# Patient Record
Sex: MALE | Employment: FULL TIME | URBAN - METROPOLITAN AREA
[De-identification: names, ages, dates, MRNs, and addresses within clinical notes are randomized per-mention and may not be internally consistent; named-entity substitution may affect disease eponyms.]

---

## 2017-06-07 ENCOUNTER — HOSPITAL ENCOUNTER (OUTPATIENT)
Dept: GENERAL RADIOLOGY | Age: 22
Discharge: HOME | End: 2017-06-07
Attending: FAMILY MEDICINE
Payer: COMMERCIAL

## 2017-06-07 ENCOUNTER — OFFICE VISIT (OUTPATIENT)
Dept: FAMILY MEDICINE CLINIC | Facility: CLINIC | Age: 22
End: 2017-06-07

## 2017-06-07 VITALS
SYSTOLIC BLOOD PRESSURE: 114 MMHG | RESPIRATION RATE: 16 BRPM | HEIGHT: 69 IN | TEMPERATURE: 98 F | BODY MASS INDEX: 24.88 KG/M2 | HEART RATE: 74 BPM | DIASTOLIC BLOOD PRESSURE: 77 MMHG | WEIGHT: 168 LBS

## 2017-06-07 DIAGNOSIS — M25.562 CHRONIC PAIN OF BOTH KNEES: Primary | ICD-10-CM

## 2017-06-07 DIAGNOSIS — G89.29 CHRONIC PAIN OF BOTH KNEES: ICD-10-CM

## 2017-06-07 DIAGNOSIS — M25.561 CHRONIC PAIN OF BOTH KNEES: ICD-10-CM

## 2017-06-07 DIAGNOSIS — M25.561 CHRONIC PAIN OF BOTH KNEES: Primary | ICD-10-CM

## 2017-06-07 DIAGNOSIS — G89.29 CHRONIC PAIN OF BOTH KNEES: Primary | ICD-10-CM

## 2017-06-07 DIAGNOSIS — M25.562 CHRONIC PAIN OF BOTH KNEES: ICD-10-CM

## 2017-06-07 PROCEDURE — 99212 OFFICE O/P EST SF 10 MIN: CPT | Performed by: FAMILY MEDICINE

## 2017-06-07 PROCEDURE — 73564 X-RAY EXAM KNEE 4 OR MORE: CPT | Performed by: FAMILY MEDICINE

## 2017-06-07 PROCEDURE — 99202 OFFICE O/P NEW SF 15 MIN: CPT | Performed by: FAMILY MEDICINE

## 2017-06-07 NOTE — PROGRESS NOTES
HPI:    Alphonse Bryson is a 19year old male presents to clinic as a new patient with bilateral knee pain.  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        Meds This Visit:    No prescriptions requested or ordered in this encounter    Imaging & Referrals:  XR KNEE, COMPLETE (4 OR MORE VIEWS

## 2017-06-09 ENCOUNTER — TELEPHONE (OUTPATIENT)
Dept: FAMILY MEDICINE CLINIC | Facility: CLINIC | Age: 22
End: 2017-06-09

## 2017-06-09 NOTE — TELEPHONE ENCOUNTER
Per pt request left VM with results and doctor's recommendations with instructions to call back if any questions. Normal letter and PT order mailed to pt at home address on file.

## 2017-06-09 NOTE — TELEPHONE ENCOUNTER
----- Message from Joseph Cook MD sent at 6/7/2017  3:37 PM CDT -----  Please inform patient that both XRs are normal. I think he should try PT first. Referral placed, patient can go any where that is convenient to him because he has a PPO insurance, I

## 2017-07-17 ENCOUNTER — OFFICE VISIT (OUTPATIENT)
Dept: FAMILY MEDICINE CLINIC | Facility: CLINIC | Age: 22
End: 2017-07-17

## 2017-07-17 ENCOUNTER — TELEPHONE (OUTPATIENT)
Dept: FAMILY MEDICINE CLINIC | Facility: CLINIC | Age: 22
End: 2017-07-17

## 2017-07-17 VITALS
OXYGEN SATURATION: 96 % | BODY MASS INDEX: 24.38 KG/M2 | DIASTOLIC BLOOD PRESSURE: 56 MMHG | TEMPERATURE: 99 F | HEART RATE: 92 BPM | SYSTOLIC BLOOD PRESSURE: 112 MMHG | HEIGHT: 69 IN | WEIGHT: 164.63 LBS

## 2017-07-17 DIAGNOSIS — H10.89 OTHER CONJUNCTIVITIS: Primary | ICD-10-CM

## 2017-07-17 PROCEDURE — 99213 OFFICE O/P EST LOW 20 MIN: CPT | Performed by: FAMILY MEDICINE

## 2017-07-17 PROCEDURE — 99212 OFFICE O/P EST SF 10 MIN: CPT | Performed by: FAMILY MEDICINE

## 2017-07-17 RX ORDER — POLYMYXIN B SULFATE AND TRIMETHOPRIM 1; 10000 MG/ML; [USP'U]/ML
1 SOLUTION OPHTHALMIC EVERY 4 HOURS
Qty: 1 BOTTLE | Refills: 0 | Status: SHIPPED | OUTPATIENT
Start: 2017-07-17 | End: 2018-10-04

## 2017-07-17 NOTE — TELEPHONE ENCOUNTER
Received message from [facility] pt called to inform office eye drop prescription was not received by pharmacy.  Eye drop prescription as noted in EMR phoned into [facility] pharmacist.

## 2017-07-17 NOTE — PROGRESS NOTES
HPI:    Raquel Collado is a 19year old male presents to clinic with a 2 day history of bilateral redness in eyes and some crusting. States crusting is worse in the AM and returns every 2-3 hours.  Notes some itching, no pain, photophobia, or change in visi cases  - to call or return to clinic if no improvement in 3-5 days           Orders This Visit:  No orders of the defined types were placed in this encounter.       Meds This Visit:    Signed Prescriptions Disp Refills    Polymyxin B-Trimethoprim 52615-3.1

## 2018-10-04 ENCOUNTER — OFFICE VISIT (OUTPATIENT)
Dept: FAMILY MEDICINE CLINIC | Facility: CLINIC | Age: 23
End: 2018-10-04
Payer: COMMERCIAL

## 2018-10-04 VITALS
HEART RATE: 57 BPM | TEMPERATURE: 98 F | HEIGHT: 69 IN | WEIGHT: 163.81 LBS | SYSTOLIC BLOOD PRESSURE: 121 MMHG | DIASTOLIC BLOOD PRESSURE: 76 MMHG | BODY MASS INDEX: 24.26 KG/M2

## 2018-10-04 DIAGNOSIS — B35.3 TINEA PEDIS OF RIGHT FOOT: Primary | ICD-10-CM

## 2018-10-04 PROCEDURE — 99213 OFFICE O/P EST LOW 20 MIN: CPT | Performed by: FAMILY MEDICINE

## 2018-10-04 PROCEDURE — 99212 OFFICE O/P EST SF 10 MIN: CPT | Performed by: FAMILY MEDICINE

## 2018-10-09 NOTE — PROGRESS NOTES
HPI:    Royce De La Cruz is a 23year old male presents to clinic with a 2 month history of itching and burning of skin around his right foot. Notes that he tried OTC lotrimin which helped with itching temporarily.  Says that peeling of skin and burning is ge Prescriptions Disp Refills   • nystatin-triamcinolone 595182-8.1 UNIT/GM-% External Cream 30 g 0     Sig: Apply 1 Application topically 2 (two) times daily.        Imaging & Referrals:  None       10/8/2018  Willow Starkey MD

## 2018-11-16 ENCOUNTER — TELEPHONE (OUTPATIENT)
Dept: FAMILY MEDICINE CLINIC | Facility: CLINIC | Age: 23
End: 2018-11-16

## 2018-11-16 DIAGNOSIS — B35.3 TINEA PEDIS, UNSPECIFIED LATERALITY: Primary | ICD-10-CM

## 2018-11-16 NOTE — TELEPHONE ENCOUNTER
Patient aware of podiatry referral.   Called patient back once off of phone and left name of podiatrist along with contact number on patient's voicemail per request.

## 2018-11-16 NOTE — TELEPHONE ENCOUNTER
Per pt this medication is not working. Asking for an alternative       nystatin-triamcinolone 945601-9.2 UNIT/GM-% External Cream Apply 1 Application topically 2 (two) times daily.  Disp: 30 g Rfl: 0

## 2018-12-19 ENCOUNTER — OFFICE VISIT (OUTPATIENT)
Dept: FAMILY MEDICINE CLINIC | Facility: CLINIC | Age: 23
End: 2018-12-19
Payer: COMMERCIAL

## 2018-12-19 VITALS
HEIGHT: 69 IN | WEIGHT: 166 LBS | DIASTOLIC BLOOD PRESSURE: 80 MMHG | BODY MASS INDEX: 24.59 KG/M2 | HEART RATE: 101 BPM | SYSTOLIC BLOOD PRESSURE: 131 MMHG | RESPIRATION RATE: 18 BRPM | TEMPERATURE: 98 F

## 2018-12-19 DIAGNOSIS — L60.0 INGROWN TOENAIL: ICD-10-CM

## 2018-12-19 DIAGNOSIS — B35.3 TINEA PEDIS OF BOTH FEET: Primary | ICD-10-CM

## 2018-12-19 PROCEDURE — 99212 OFFICE O/P EST SF 10 MIN: CPT | Performed by: FAMILY MEDICINE

## 2018-12-19 PROCEDURE — 99213 OFFICE O/P EST LOW 20 MIN: CPT | Performed by: FAMILY MEDICINE

## 2018-12-19 NOTE — PROGRESS NOTES
HPI:    Amber Soto is a 20year old male presents to clinic for follow up regarding fungal infection over both feet. Notes it was improving with cream but it ran out, he was not aware that this was refilled and sent to his pharmacy.  Also, he has an ing Signed Prescriptions Disp Refills   • nystatin-triamcinolone 698609-4.4 UNIT/GM-% External Cream 60 g 0     Sig: Apply 1 Application topically 2 (two) times daily.        Imaging & Referrals:  None       12/19/2018  Matthew Aaron MD

## 2019-09-20 ENCOUNTER — OFFICE VISIT (OUTPATIENT)
Dept: FAMILY MEDICINE CLINIC | Facility: CLINIC | Age: 24
End: 2019-09-20
Payer: COMMERCIAL

## 2019-09-20 VITALS
HEIGHT: 69 IN | HEART RATE: 58 BPM | BODY MASS INDEX: 26.51 KG/M2 | RESPIRATION RATE: 20 BRPM | SYSTOLIC BLOOD PRESSURE: 117 MMHG | TEMPERATURE: 99 F | WEIGHT: 179 LBS | DIASTOLIC BLOOD PRESSURE: 83 MMHG

## 2019-09-20 DIAGNOSIS — K14.8 LESION OF TONGUE: Primary | ICD-10-CM

## 2019-09-20 PROCEDURE — 90471 IMMUNIZATION ADMIN: CPT | Performed by: FAMILY MEDICINE

## 2019-09-20 PROCEDURE — 90686 IIV4 VACC NO PRSV 0.5 ML IM: CPT | Performed by: FAMILY MEDICINE

## 2019-09-20 PROCEDURE — 99213 OFFICE O/P EST LOW 20 MIN: CPT | Performed by: FAMILY MEDICINE

## 2019-09-20 NOTE — PROGRESS NOTES
HPI:    Mela Mena is a 20year old male presents to clinic with concerns regarding a lesion on his tongue. 3 weeks back, patient noticed a fleshy lesion on the front of his tongue. Denies pain, tenderness, bleeding. No new sexual partners.   Patient 9/20/2019  Jania Jesus MD

## 2019-09-27 ENCOUNTER — OFFICE VISIT (OUTPATIENT)
Dept: OTOLARYNGOLOGY | Facility: CLINIC | Age: 24
End: 2019-09-27
Payer: COMMERCIAL

## 2019-09-27 VITALS
DIASTOLIC BLOOD PRESSURE: 82 MMHG | HEIGHT: 69 IN | SYSTOLIC BLOOD PRESSURE: 122 MMHG | WEIGHT: 179 LBS | BODY MASS INDEX: 26.51 KG/M2

## 2019-09-27 DIAGNOSIS — K14.8 TONGUE LESION: Primary | ICD-10-CM

## 2019-09-27 PROCEDURE — 99243 OFF/OP CNSLTJ NEW/EST LOW 30: CPT | Performed by: OTOLARYNGOLOGY

## 2019-09-27 PROCEDURE — 41100 BIOPSY OF TONGUE: CPT | Performed by: OTOLARYNGOLOGY

## 2019-09-27 NOTE — PROGRESS NOTES
Shelly Moody is a 20year old male.   Patient presents with:  Mouth/Lip Problem: pt reports has a lesion on tongue for 1 month, no pain       HISTORY OF PRESENT ILLNESS  Orlando Perez with a history of a lesion which he believes has been on the tip of sinus about a time, place, person & situation. Appropriate mood and affect.    Neck Exam Normal Inspection - Normal. Palpation - Normal. Parotid gland - Normal. Thyroid gland - Normal.   Eyes Normal Conjunctiva - Right: Normal, Left: Normal. Pupil - Right: Normal, Left:

## 2019-09-30 ENCOUNTER — TELEPHONE (OUTPATIENT)
Dept: OTOLARYNGOLOGY | Facility: CLINIC | Age: 24
End: 2019-09-30

## 2019-09-30 NOTE — TELEPHONE ENCOUNTER
Linda Melton is requesting to speak with RN in regards to the source of the biopsy. States to leave a detailed voicemail if she is unable to answer.

## 2019-12-11 ENCOUNTER — APPOINTMENT (OUTPATIENT)
Dept: LAB | Age: 24
End: 2019-12-11
Attending: FAMILY MEDICINE
Payer: COMMERCIAL

## 2019-12-11 ENCOUNTER — OFFICE VISIT (OUTPATIENT)
Dept: FAMILY MEDICINE CLINIC | Facility: CLINIC | Age: 24
End: 2019-12-11
Payer: COMMERCIAL

## 2019-12-11 VITALS
DIASTOLIC BLOOD PRESSURE: 74 MMHG | HEART RATE: 76 BPM | WEIGHT: 177 LBS | BODY MASS INDEX: 26.22 KG/M2 | TEMPERATURE: 98 F | HEIGHT: 69 IN | SYSTOLIC BLOOD PRESSURE: 131 MMHG

## 2019-12-11 DIAGNOSIS — Z00.00 ANNUAL PHYSICAL EXAM: Primary | ICD-10-CM

## 2019-12-11 PROCEDURE — 99395 PREV VISIT EST AGE 18-39: CPT | Performed by: FAMILY MEDICINE

## 2019-12-11 PROCEDURE — 36415 COLL VENOUS BLD VENIPUNCTURE: CPT

## 2019-12-11 NOTE — PROGRESS NOTES
HPI:    Concha Cardona is a 30year old male presents to clinic for an annual physical exam.   No acute concerns. Does not take meds, OTC or Rx. Normal appetite, tries to eat healthy foods. Normal BMs and urination. Normal sleep habits.  Currently not e rebound and no guarding. Lymphadenopathy:     He has no cervical adenopathy. Neurological: He is alert. Vitals reviewed.       ASSESSMENT/PLAN:   (Z00.00) Annual physical exam  (primary encounter diagnosis)  Plan: CBC WITH DIFFERENTIAL WITH PLATELET,

## 2020-08-21 ENCOUNTER — OFFICE VISIT (OUTPATIENT)
Dept: FAMILY MEDICINE CLINIC | Facility: CLINIC | Age: 25
End: 2020-08-21
Payer: COMMERCIAL

## 2020-08-21 VITALS
DIASTOLIC BLOOD PRESSURE: 75 MMHG | WEIGHT: 180.5 LBS | SYSTOLIC BLOOD PRESSURE: 125 MMHG | HEART RATE: 94 BPM | BODY MASS INDEX: 26.73 KG/M2 | TEMPERATURE: 98 F | HEIGHT: 69 IN

## 2020-08-21 DIAGNOSIS — M25.512 LEFT SHOULDER PAIN, UNSPECIFIED CHRONICITY: Primary | ICD-10-CM

## 2020-08-21 PROCEDURE — 3078F DIAST BP <80 MM HG: CPT | Performed by: FAMILY MEDICINE

## 2020-08-21 PROCEDURE — 3074F SYST BP LT 130 MM HG: CPT | Performed by: FAMILY MEDICINE

## 2020-08-21 PROCEDURE — 3008F BODY MASS INDEX DOCD: CPT | Performed by: FAMILY MEDICINE

## 2020-08-21 PROCEDURE — 99213 OFFICE O/P EST LOW 20 MIN: CPT | Performed by: FAMILY MEDICINE

## 2021-03-17 DIAGNOSIS — Z23 NEED FOR VACCINATION: ICD-10-CM

## 2021-03-29 ENCOUNTER — OFFICE VISIT (OUTPATIENT)
Dept: FAMILY MEDICINE CLINIC | Facility: CLINIC | Age: 26
End: 2021-03-29
Payer: COMMERCIAL

## 2021-03-29 VITALS
TEMPERATURE: 97 F | HEIGHT: 69 IN | RESPIRATION RATE: 18 BRPM | DIASTOLIC BLOOD PRESSURE: 78 MMHG | SYSTOLIC BLOOD PRESSURE: 128 MMHG | BODY MASS INDEX: 26.81 KG/M2 | HEART RATE: 88 BPM | WEIGHT: 181 LBS

## 2021-03-29 DIAGNOSIS — L29.9 ITCHING OF EAR: Primary | ICD-10-CM

## 2021-03-29 PROCEDURE — 3008F BODY MASS INDEX DOCD: CPT | Performed by: FAMILY MEDICINE

## 2021-03-29 PROCEDURE — 3074F SYST BP LT 130 MM HG: CPT | Performed by: FAMILY MEDICINE

## 2021-03-29 PROCEDURE — 3078F DIAST BP <80 MM HG: CPT | Performed by: FAMILY MEDICINE

## 2021-03-29 PROCEDURE — 99213 OFFICE O/P EST LOW 20 MIN: CPT | Performed by: FAMILY MEDICINE

## 2021-03-29 RX ORDER — NEOMYCIN SULFATE, POLYMYXIN B SULFATE AND HYDROCORTISONE 10; 3.5; 1 MG/ML; MG/ML; [USP'U]/ML
3 SUSPENSION/ DROPS AURICULAR (OTIC) 4 TIMES DAILY
Qty: 1 BOTTLE | Refills: 0 | Status: SHIPPED | OUTPATIENT
Start: 2021-03-29 | End: 2021-04-05

## 2021-03-29 NOTE — PROGRESS NOTES
HPI:    Vladislav Billingsley is a 30year old male presents to clinic with concerns regarding his ears. Patient has been experiencing itching in both ears. Started a few months back after an ear infection, has been progressively getting worse.   Last night, paula drops to pharmacy, to use in both ears, about 3 times per day. Advised to avoid air pods, if he needs to use them can apply a small amount of hydrocortisone beforehand.   Follow-up if no improvement     Responsible party/patient verbalized understanding of

## 2022-05-10 ENCOUNTER — LAB ENCOUNTER (OUTPATIENT)
Dept: LAB | Age: 27
End: 2022-05-10
Attending: FAMILY MEDICINE
Payer: COMMERCIAL

## 2022-05-10 ENCOUNTER — OFFICE VISIT (OUTPATIENT)
Dept: FAMILY MEDICINE CLINIC | Facility: CLINIC | Age: 27
End: 2022-05-10
Payer: COMMERCIAL

## 2022-05-10 VITALS
DIASTOLIC BLOOD PRESSURE: 77 MMHG | HEART RATE: 80 BPM | HEIGHT: 70 IN | OXYGEN SATURATION: 99 % | WEIGHT: 172 LBS | BODY MASS INDEX: 24.62 KG/M2 | SYSTOLIC BLOOD PRESSURE: 114 MMHG | RESPIRATION RATE: 19 BRPM

## 2022-05-10 DIAGNOSIS — M25.559 HIP PAIN: ICD-10-CM

## 2022-05-10 DIAGNOSIS — Z00.00 ANNUAL PHYSICAL EXAM: Primary | ICD-10-CM

## 2022-05-10 DIAGNOSIS — G89.29 CHRONIC BILATERAL LOW BACK PAIN WITHOUT SCIATICA: ICD-10-CM

## 2022-05-10 DIAGNOSIS — M54.50 CHRONIC BILATERAL LOW BACK PAIN WITHOUT SCIATICA: ICD-10-CM

## 2022-05-10 PROCEDURE — 90471 IMMUNIZATION ADMIN: CPT | Performed by: FAMILY MEDICINE

## 2022-05-10 PROCEDURE — 90715 TDAP VACCINE 7 YRS/> IM: CPT | Performed by: FAMILY MEDICINE

## 2022-05-10 PROCEDURE — 3078F DIAST BP <80 MM HG: CPT | Performed by: FAMILY MEDICINE

## 2022-05-10 PROCEDURE — 99395 PREV VISIT EST AGE 18-39: CPT | Performed by: FAMILY MEDICINE

## 2022-05-10 PROCEDURE — 3008F BODY MASS INDEX DOCD: CPT | Performed by: FAMILY MEDICINE

## 2022-05-10 PROCEDURE — 3074F SYST BP LT 130 MM HG: CPT | Performed by: FAMILY MEDICINE

## 2022-05-10 NOTE — PROGRESS NOTES
HPI:    Kelli Foster is a 27year old male presents to clinic for annual physical exam.  No acute concerns or major changes in health. Normal appetite, balanced diet. Normal bowel movements and urination per normal sleep habits. Exercises regularly. Engaged. Denies new sexual partners. No changes in family history. HISTORY:  History reviewed. No pertinent past medical history. History reviewed. No pertinent surgical history. History reviewed. No pertinent family history. Social History: Social History    Tobacco Use      Smoking status: Never Smoker      Smokeless tobacco: Never Used    Vaping Use      Vaping Use: Never used    Alcohol use: Yes      Alcohol/week: 5.0 standard drinks      Types: 5 Cans of beer per week      Comment: Weekly    Drug use: Yes      Types: Cannabis       Medications (Active prior to today's visit):  No current outpatient medications on file. Allergies:  No Known Allergies      Depression Screening (PHQ-2/PHQ-9): Over the LAST 2 WEEKS   Little interest or pleasure in doing things: Several days    Feeling down, depressed, or hopeless: Several days    PHQ-2 SCORE: 2   1. Little interest or pleasure in doing things: Several days  2. Feeling down, depressed, or hopeless: Several days  3.    4.    5.    6.    7.    8.    9.                 ROS:   Review of Systems   All other systems reviewed and are negative. PHYSICAL EXAM:      05/10/22  0937   BP: 114/77   BP Location: Right arm   Patient Position: Sitting   Pulse: 80   Resp: 19   SpO2: 99%   Weight: 172 lb (78 kg)   Height: 5' 10\" (1.778 m)     Physical Exam  Vitals reviewed. Constitutional:       General: He is not in acute distress. HENT:      Head: Normocephalic and atraumatic. Right Ear: Tympanic membrane, ear canal and external ear normal.      Left Ear: Tympanic membrane, ear canal and external ear normal.      Nose: Nose normal.      Mouth/Throat:      Pharynx: Uvula midline.    Eyes: Conjunctiva/sclera: Conjunctivae normal.      Pupils: Pupils are equal, round, and reactive to light. Neck:      Thyroid: No thyromegaly. Cardiovascular:      Rate and Rhythm: Normal rate and regular rhythm. Heart sounds: Normal heart sounds. No murmur heard. Pulmonary:      Effort: Pulmonary effort is normal. No respiratory distress. Breath sounds: Normal breath sounds. No wheezing or rales. Abdominal:      General: Bowel sounds are normal. There is no distension. Palpations: Abdomen is soft. Tenderness: There is no abdominal tenderness. There is no guarding or rebound. Musculoskeletal:      Cervical back: Normal range of motion and neck supple. Lymphadenopathy:      Cervical: No cervical adenopathy. Neurological:      Mental Status: He is alert. ASSESSMENT/PLAN:   (Z00.00) Annual physical exam  (primary encounter diagnosis)  Plan: CBC WITH DIFFERENTIAL WITH PLATELET, COMP         METABOLIC PANEL (14), TSH W REFLEX TO FREE T4,         LIPID PANEL  - Tdap given, vaccines otherwise UTD  - Reinforced healthy diet, lifestyle, and exercise. - Past Medical/Social/Family histories reviewed  - Regular dental visits recommended   - Regular eye exams recommended     Follow up in 1 year or sooner if needed         Responsible party/patient verbalized understanding of information discussed. No barriers to learning observed.           Orders This Visit:  Orders Placed This Encounter      CBC W Differential W Platelet [E]      COMP METABOLIC PANEL [18920] [Q]      TSH W REFLEX TO FREE T4 [79496][Q]      LIPID PANEL [7600] [Q]      TETANUS, DIPHTHERIA TOXOIDS AND ACELLULAR PERTUSIS VACCINE (TDAP), >7 YEARS, IM USE      Meds This Visit:  Requested Prescriptions      No prescriptions requested or ordered in this encounter       Imaging & Referrals:  TETANUS, DIPHTHERIA TOXOIDS AND ACELLULAR PERTUSIS VACCINE (TDAP), >7 YEARS, IM USE       The 21st Century cures Act makes medical notes like these available to patients in the interest of transparency. However, be advised that this is a medical document. It is intended as peer to peer communication. It is written in medical language and may contain abbreviations or verbiage that are unfamiliar. It may appear blunt or direct. Medical documents are intended to carry relevant information, facts as evident, and the clinical opinion of the practitioner. This note was created by [employer] voice recognition. Errors in content may be related to improper recognition by the system; efforts to review and correct have been done but errors may still exist. Please contact me with any questions.        5/10/2022  Tien Beal MD

## 2022-05-11 LAB
ABSOLUTE BASOPHILS: 88 CELLS/UL (ref 0–200)
ABSOLUTE EOSINOPHILS: 117 CELLS/UL (ref 15–500)
ABSOLUTE LYMPHOCYTES: 2482 CELLS/UL (ref 850–3900)
ABSOLUTE MONOCYTES: 416 CELLS/UL (ref 200–950)
ABSOLUTE NEUTROPHILS: 4198 CELLS/UL (ref 1500–7800)
ALBUMIN/GLOBULIN RATIO: 2.2 (CALC) (ref 1–2.5)
ALBUMIN: 4.6 G/DL (ref 3.6–5.1)
ALKALINE PHOSPHATASE: 65 U/L (ref 36–130)
ALT: 14 U/L (ref 9–46)
AST: 18 U/L (ref 10–40)
BASOPHILS: 1.2 %
BILIRUBIN, TOTAL: 1.3 MG/DL (ref 0.2–1.2)
BUN: 24 MG/DL (ref 7–25)
CALCIUM: 9.8 MG/DL (ref 8.6–10.3)
CARBON DIOXIDE: 29 MMOL/L (ref 20–32)
CHLORIDE: 102 MMOL/L (ref 98–110)
CHOL/HDLC RATIO: 3.1 (CALC)
CHOLESTEROL, TOTAL: 146 MG/DL
CREATININE: 1.26 MG/DL (ref 0.6–1.35)
EGFR IF AFRICN AM: 89 ML/MIN/1.73M2
EGFR IF NONAFRICN AM: 77 ML/MIN/1.73M2
EOSINOPHILS: 1.6 %
GLOBULIN: 2.1 G/DL (CALC) (ref 1.9–3.7)
GLUCOSE: 79 MG/DL (ref 65–99)
HDL CHOLESTEROL: 47 MG/DL
HEMATOCRIT: 47.2 % (ref 38.5–50)
HEMOGLOBIN: 16 G/DL (ref 13.2–17.1)
LDL-CHOLESTEROL: 77 MG/DL (CALC)
LYMPHOCYTES: 34 %
MCH: 28.8 PG (ref 27–33)
MCHC: 33.9 G/DL (ref 32–36)
MCV: 85 FL (ref 80–100)
MONOCYTES: 5.7 %
MPV: 10.8 FL (ref 7.5–12.5)
NEUTROPHILS: 57.5 %
NON-HDL CHOLESTEROL: 99 MG/DL (CALC)
PLATELET COUNT: 210 THOUSAND/UL (ref 140–400)
POTASSIUM: 4.2 MMOL/L (ref 3.5–5.3)
PROTEIN, TOTAL: 6.7 G/DL (ref 6.1–8.1)
RDW: 13 % (ref 11–15)
RED BLOOD CELL COUNT: 5.55 MILLION/UL (ref 4.2–5.8)
SODIUM: 141 MMOL/L (ref 135–146)
TRIGLYCERIDES: 134 MG/DL
TSH W/REFLEX TO FT4: 1.65 MIU/L (ref 0.4–4.5)
WHITE BLOOD CELL COUNT: 7.3 THOUSAND/UL (ref 3.8–10.8)

## 2022-08-29 ENCOUNTER — NURSE TRIAGE (OUTPATIENT)
Dept: FAMILY MEDICINE CLINIC | Facility: CLINIC | Age: 27
End: 2022-08-29

## 2023-10-19 ENCOUNTER — TELEMEDICINE (OUTPATIENT)
Dept: INTERNAL MEDICINE CLINIC | Facility: CLINIC | Age: 28
End: 2023-10-19

## 2023-10-19 ENCOUNTER — NURSE TRIAGE (OUTPATIENT)
Dept: FAMILY MEDICINE CLINIC | Facility: CLINIC | Age: 28
End: 2023-10-19

## 2023-10-19 DIAGNOSIS — U07.1 POSITIVE SELF-ADMINISTERED ANTIGEN TEST FOR COVID-19: Primary | ICD-10-CM

## 2023-10-19 PROCEDURE — 99202 OFFICE O/P NEW SF 15 MIN: CPT

## 2023-10-19 RX ORDER — BENZONATATE 100 MG/1
100 CAPSULE ORAL 2 TIMES DAILY PRN
Qty: 20 CAPSULE | Refills: 0 | Status: SHIPPED | OUTPATIENT
Start: 2023-10-19

## 2023-10-19 NOTE — TELEPHONE ENCOUNTER
Patient called. He has been off for several days due to illness. He tested at home for covid and positive; completed yesterday. Symptoms started Monday. Had fever; has constant cough, feels fatigue. Difficult to sleep at night. Patient needs to submit work note to employer. Patient needs evaluation.   Video visit scheduled with Dora YAP 2pm       Reason for Disposition   [1] Continuous (nonstop) coughing interferes with work or school AND [2] no improvement using cough treatment per Care Advice    Protocols used: Coronavirus ([id number]) Diagnosed or [id number]

## 2023-10-19 NOTE — PROGRESS NOTES
This is a telemedicine visit with live, interactive video and audio. Patient understands and accepts financial responsibility for any deductible, co-insurance and/or co-pays associated with this service. SUBJECTIVE  Tested positive for COVID yesterday via home test  He was exposed to friends who also tested positive   Symptoms started Sunday with fatigue, then Monday morning had a fever of 102 and was having sweats and chills   Cough started yesterday when the fever broke   It is mostly dry and occasional phlegm   Took mucinex which helped loosen it   Today temperature is 99   Nasal congestion and sore throat related to coughing   Has been taking dayquil and ibuprofen   Then tried tylenol severe cold and flu which has been helping a lot   Still feels fatigued and woozy when he stands up since he has been lying in bed for several days   Cough is the most bothersome with chills as the second worst symptom     Had to take 4 days off work due to symptoms and states his job will be sending us a form    HISTORY:  History reviewed. No pertinent past medical history. History reviewed. No pertinent surgical history. History reviewed. No pertinent family history. Social History     Socioeconomic History    Marital status:    Tobacco Use    Smoking status: Never    Smokeless tobacco: Never   Vaping Use    Vaping Use: Never used   Substance and Sexual Activity    Alcohol use: Yes     Alcohol/week: 5.0 standard drinks of alcohol     Types: 5 Cans of beer per week     Comment: Weekly    Drug use: Yes     Types: Cannabis        No Known Allergies   Current Outpatient Medications   Medication Sig Dispense Refill    benzonatate 100 MG Oral Cap Take 1 capsule (100 mg total) by mouth 2 (two) times daily as needed for cough.  20 capsule 0     OBJECTIVE  Physical Exam:   alert, appears stated age, cooperative, and fatigued, Speaking in full sentences comfortably, and Normal work of breathing  Congested voice, occasional cough during exam, appears tired    ASSESSMENT & PLAN  Diagnoses and all orders for this visit:    Positive self-administered antigen test for COVID-19  -     benzonatate 100 MG Oral Cap; Take 1 capsule (100 mg total) by mouth 2 (two) times daily as needed for cough. Can continue tylenol severe cold & flu as directed     CDC Update: Isolation & Quarantine Guidelines for Patients - Updated 12/29/2021    If You Test Positive for COVID-19 (Isolate)  Everyone regardless of vaccination status:   Stay home for 5 days  If you have no symptoms or your symptoms are resolving after 5 days, you can leave your house, return to work, school, travel, etc.  If you have been fever-free for 24 hours without the use of fever-reducing medication. If you have not experienced any GI symptoms in the last 24 hours. If you are seeing improvement of symptoms - fatigue, muscle aches, runny nose, congestion, cough, sore throat, headache  When returning to Work, [employer], etc.  Continue to wear a mask around others for 5 additional days.     6m 31s  Total time spent: HUMPHREY Cuenca

## 2024-03-29 ENCOUNTER — PATIENT MESSAGE (OUTPATIENT)
Dept: FAMILY MEDICINE CLINIC | Facility: CLINIC | Age: 29
End: 2024-03-29

## 2024-03-29 NOTE — TELEPHONE ENCOUNTER
From: Peter REYES Head  To: Brice Anaya  Sent: 3/29/2024 11:30 AM CDT  Subject: Documents for apt at 1pm    We will fill out the questionaire together and talk through my needs. the role description provides an idea of what i do at my workplace

## 2024-05-31 ENCOUNTER — LAB ENCOUNTER (OUTPATIENT)
Dept: LAB | Age: 29
End: 2024-05-31
Attending: FAMILY MEDICINE
Payer: COMMERCIAL

## 2024-05-31 ENCOUNTER — OFFICE VISIT (OUTPATIENT)
Dept: FAMILY MEDICINE CLINIC | Facility: CLINIC | Age: 29
End: 2024-05-31

## 2024-05-31 VITALS
HEART RATE: 83 BPM | OXYGEN SATURATION: 96 % | DIASTOLIC BLOOD PRESSURE: 66 MMHG | BODY MASS INDEX: 26.36 KG/M2 | TEMPERATURE: 98 F | WEIGHT: 178 LBS | HEIGHT: 69 IN | SYSTOLIC BLOOD PRESSURE: 110 MMHG

## 2024-05-31 DIAGNOSIS — Z00.00 ANNUAL PHYSICAL EXAM: Primary | ICD-10-CM

## 2024-05-31 DIAGNOSIS — N50.9 TESTICULAR ABNORMALITY: ICD-10-CM

## 2024-05-31 DIAGNOSIS — Z00.00 ANNUAL PHYSICAL EXAM: ICD-10-CM

## 2024-05-31 LAB
ANION GAP SERPL CALC-SCNC: 7 MMOL/L (ref 0–18)
BUN BLD-MCNC: 23 MG/DL (ref 9–23)
BUN/CREAT SERPL: 19.7 (ref 10–20)
CALCIUM BLD-MCNC: 9.9 MG/DL (ref 8.7–10.4)
CHLORIDE SERPL-SCNC: 106 MMOL/L (ref 98–112)
CHOLEST SERPL-MCNC: 161 MG/DL (ref ?–200)
CO2 SERPL-SCNC: 31 MMOL/L (ref 21–32)
CREAT BLD-MCNC: 1.17 MG/DL
DEPRECATED RDW RBC AUTO: 37.3 FL (ref 35.1–46.3)
EGFRCR SERPLBLD CKD-EPI 2021: 86 ML/MIN/1.73M2 (ref 60–?)
ERYTHROCYTE [DISTWIDTH] IN BLOOD BY AUTOMATED COUNT: 12.6 % (ref 11–15)
FASTING PATIENT LIPID ANSWER: YES
FASTING STATUS PATIENT QL REPORTED: YES
GLUCOSE BLD-MCNC: 85 MG/DL (ref 70–99)
HCT VFR BLD AUTO: 43.7 %
HDLC SERPL-MCNC: 46 MG/DL (ref 40–59)
HGB BLD-MCNC: 15.6 G/DL
LDLC SERPL CALC-MCNC: 94 MG/DL (ref ?–100)
MCH RBC QN AUTO: 29.2 PG (ref 26–34)
MCHC RBC AUTO-ENTMCNC: 35.7 G/DL (ref 31–37)
MCV RBC AUTO: 81.7 FL
NONHDLC SERPL-MCNC: 115 MG/DL (ref ?–130)
OSMOLALITY SERPL CALC.SUM OF ELEC: 301 MOSM/KG (ref 275–295)
PLATELET # BLD AUTO: 205 10(3)UL (ref 150–450)
POTASSIUM SERPL-SCNC: 4.1 MMOL/L (ref 3.5–5.1)
RBC # BLD AUTO: 5.35 X10(6)UL
SODIUM SERPL-SCNC: 144 MMOL/L (ref 136–145)
TRIGL SERPL-MCNC: 119 MG/DL (ref 30–149)
TSI SER-ACNC: 2.23 MIU/ML (ref 0.55–4.78)
VLDLC SERPL CALC-MCNC: 19 MG/DL (ref 0–30)
WBC # BLD AUTO: 6.9 X10(3) UL (ref 4–11)

## 2024-05-31 PROCEDURE — 80061 LIPID PANEL: CPT

## 2024-05-31 PROCEDURE — 84443 ASSAY THYROID STIM HORMONE: CPT

## 2024-05-31 PROCEDURE — 85027 COMPLETE CBC AUTOMATED: CPT

## 2024-05-31 PROCEDURE — 80048 BASIC METABOLIC PNL TOTAL CA: CPT

## 2024-05-31 PROCEDURE — 36415 COLL VENOUS BLD VENIPUNCTURE: CPT

## 2024-05-31 PROCEDURE — 99395 PREV VISIT EST AGE 18-39: CPT | Performed by: FAMILY MEDICINE

## 2024-05-31 NOTE — H&P
HPI:    Peter Yanez is a 28 year old male presents clinic for annual physical exam.  No acute concerns.  Patient has had a small lump in his testicle that him and his wife have noticed intermittently.  Denies pain, does not believe the swelling has gotten significantly bigger over time.  Believes he has had this for over a year.  Normal appetite, tries eat healthy foods.  Normal bowel movements urination.  No change in sleep habits.  He reports some work-related stress.  Plans to start seeing a therapist soon.  Denies thoughts of self-harm/harming others.      HISTORY:  No past medical history on file.   No past surgical history on file.   No family history on file.   Social History:   Social History     Socioeconomic History    Marital status:    Tobacco Use    Smoking status: Never    Smokeless tobacco: Never   Vaping Use    Vaping status: Never Used   Substance and Sexual Activity    Alcohol use: Yes     Alcohol/week: 5.0 standard drinks of alcohol     Types: 5 Cans of beer per week     Comment: Weekly    Drug use: Yes     Types: Cannabis        Medications (Active prior to today's visit):  No current outpatient medications on file.       Allergies:  Allergies   Allergen Reactions    Tree Nuts SWELLING         Depression Screening (PHQ-2/PHQ-9): Over the LAST 2 WEEKS   Little interest or pleasure in doing things: Several days    Feeling down, depressed, or hopeless: Several days    PHQ-2 SCORE: 2   1. Little interest or pleasure in doing things: Several days  2. Feeling down, depressed, or hopeless: Several days  3. Trouble falling or staying asleep, or sleeping too much: Several days  4. Feeling tired or having little energy: Not at all  5. Poor appetite or overeating: Not at all  6. Feeling bad about yourself - or that you are a failure or have let yourself or your family down: Not at all  7. Trouble concentrating on things, such as reading the newspaper or watching television: Not at all  8. Moving  or speaking so slowly that other people could have noticed. Or the opposite - being so fidgety or restless that you have been moving around a lot more than usual: Several days  9. Thoughts that you would be better off dead, or of hurting yourself in some way: Not at all  PHQ-9 TOTAL SCORE: 4  If you checked off any problems, how difficult have these problems made it for you to do your work, take care of things at home, or get along with other people?: Somewhat difficult         ROS:   Review of Systems   All other systems reviewed and are negative.      PHYSICAL EXAM:     Vitals:    05/31/24 0901   BP: 110/66   BP Location: Right arm   Patient Position: Sitting   Cuff Size: adult   Pulse: 83   Temp: 97.9 °F (36.6 °C)   TempSrc: Temporal   SpO2: 96%   Weight: 178 lb (80.7 kg)   Height: 5' 9\" (1.753 m)     Physical Exam  Vitals reviewed.   Constitutional:       General: He is not in acute distress.  HENT:      Head: Normocephalic and atraumatic.      Right Ear: Tympanic membrane, ear canal and external ear normal.      Left Ear: Tympanic membrane, ear canal and external ear normal.      Nose: Nose normal.      Mouth/Throat:      Pharynx: Uvula midline.   Eyes:      Conjunctiva/sclera: Conjunctivae normal.      Pupils: Pupils are equal, round, and reactive to light.   Neck:      Thyroid: No thyromegaly.   Cardiovascular:      Rate and Rhythm: Normal rate and regular rhythm.      Heart sounds: Normal heart sounds. No murmur heard.  Pulmonary:      Effort: Pulmonary effort is normal. No respiratory distress.      Breath sounds: Normal breath sounds. No wheezing or rales.   Abdominal:      General: Bowel sounds are normal. There is no distension.      Palpations: Abdomen is soft.      Tenderness: There is no abdominal tenderness. There is no guarding or rebound.   Genitourinary:     Penis: Normal.       Testes: Normal.   Musculoskeletal:      Cervical back: Normal range of motion and neck supple.   Lymphadenopathy:       Cervical: No cervical adenopathy.   Neurological:      Mental Status: He is alert.         ASSESSMENT/PLAN:   (Z00.00) Annual physical exam  (primary encounter diagnosis)  Plan: Lipid Panel [E], TSH W Reflex To Free T4 [E],         Basic Metabolic Panel (8) [E], CBC, Platelet,         No Differential [E]  - Immunizations UTD   - Reinforced healthy diet, lifestyle, and exercise.  - Past Medical/Social/Family histories reviewed  - Regular dental visits recommended   - Regular eye exams recommended       Follow up in 1 year or sooner if needed      (N50.9) Testicular abnormality  Plan:   -Unremarkable testicular exam, no masses/swellings palpated.  No tenderness on exam.  Offered ultrasound, declined for now.  Will continue to follow.               Responsible party/patient verbalized understanding of information discussed. No barriers to learning observed.            Orders This Visit:  Orders Placed This Encounter   Procedures    Lipid Panel [E]    TSH W Reflex To Free T4 [E]    Basic Metabolic Panel (8) [E]    CBC, Platelet, No Differential [E]       Meds This Visit:  Requested Prescriptions      No prescriptions requested or ordered in this encounter       Imaging & Referrals:  None     Chaperone offered at visit today.     The 21st Century cures Act makes medical notes like these available to patients in the interest of transparency.  However, be advised that this is a medical document.  It is intended as peer to peer communication.  It is written in medical language and may contain abbreviations or verbiage that are unfamiliar.  It may appear blunt or direct.  Medical documents are intended to carry relevant information, facts as evident, and the clinical opinion of the practitioner.      This note was created by [employer] voice recognition. Errors in content may be related to improper recognition by the system; efforts to review and correct have been done but errors may still exist. Please contact me with any  questions.       5/31/2024  Brice Anaya MD

## 2024-09-27 ENCOUNTER — PATIENT MESSAGE (OUTPATIENT)
Dept: FAMILY MEDICINE CLINIC | Facility: CLINIC | Age: 29
End: 2024-09-27

## 2024-09-27 ENCOUNTER — TELEPHONE (OUTPATIENT)
Dept: FAMILY MEDICINE CLINIC | Facility: CLINIC | Age: 29
End: 2024-09-27

## 2024-09-27 NOTE — TELEPHONE ENCOUNTER
[facility] - in person or virtual, we have openings next week Wednesday. May use res 24. Thanks.

## 2024-09-27 NOTE — TELEPHONE ENCOUNTER
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  His therapist that patient take a leave due to his anxiety, however, pcp must complete forms for employer.    Contact patient if needs an appointment please advise him.  He said Dr Dicknes's first avail was Oct 8th.    [phone] okay to leave a voicemail

## 2024-09-30 ENCOUNTER — TELEPHONE (OUTPATIENT)
Dept: FAMILY MEDICINE CLINIC | Facility: CLINIC | Age: 29
End: 2024-09-30

## 2024-10-03 NOTE — TELEPHONE ENCOUNTER
Short term disability forms with valid authorization signed on 9/27/24 received. Authorization already in patient chart via [employer] message on 9/27/24. Logged for processing.

## 2024-10-07 ENCOUNTER — TELEPHONE (OUTPATIENT)
Dept: FAMILY MEDICINE CLINIC | Facility: CLINIC | Age: 29
End: 2024-10-07

## 2024-10-07 NOTE — TELEPHONE ENCOUNTER
Start 9/23/24 -10/2/24  Patient is currently admitted in a different facility.  Informed patient that I will task provider for the days he was under  care with [facility] anything after current facility he is seeing will take over.   Valid Release of Information from [place]     Patient was admitted 10/4 at [facility]

## 2024-10-07 NOTE — TELEPHONE ENCOUNTER
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    Do you support?      Thank you     Pily

## 2024-10-07 NOTE — TELEPHONE ENCOUNTER
Received second set of Disability Forms with a new date to turn in the information. Will send over to Forms dept for review.

## 2024-10-07 NOTE — TELEPHONE ENCOUNTER
Lvm to call back, need dates off work  Type of Leave: std  Reason for Leave: anxiety, panic attacks  Start date of leave:  End date of leave:

## 2024-10-08 NOTE — TELEPHONE ENCOUNTER
Dr. Anaya    **The ACKNOWLEDGE button has been moved to the top right ribbon**    Please sign off on form if you agree to: short term disability  (place your signature on the first page only)  -From your Inbasket, Highlight the patient and click Chart  -Double click the 9/30/24 Forms Completion telephone encounter  -Scroll down to the Media section  -Click the blue Hyperlink: NEW [facility]ls[facility] Dr. Anaya 10/8/24    -Click Acknowledge located in the top right ribbon/menu  -Drag the mouse into the blank space of the document and a + sign will appear. Left click to  electronically sign the document.    Thank you,    Halie

## 2024-11-26 ENCOUNTER — TELEPHONE (OUTPATIENT)
Dept: FAMILY MEDICINE CLINIC | Facility: CLINIC | Age: 29
End: 2024-11-26

## 2024-12-04 NOTE — TELEPHONE ENCOUNTER
Protected health information request from [employer] received in the forms department and logged for processing.

## 2024-12-13 NOTE — TELEPHONE ENCOUNTER
Protected health information E-faxed to [facility] [phone], awaiting confirmation.

## 2025-01-31 ENCOUNTER — OFFICE VISIT (OUTPATIENT)
Dept: INTERNAL MEDICINE CLINIC | Facility: CLINIC | Age: 30
End: 2025-01-31
Payer: COMMERCIAL

## 2025-01-31 ENCOUNTER — HOSPITAL ENCOUNTER (OUTPATIENT)
Dept: GENERAL RADIOLOGY | Age: 30
Discharge: HOME | End: 2025-01-31
Attending: INTERNAL MEDICINE
Payer: COMMERCIAL

## 2025-01-31 VITALS
HEART RATE: 71 BPM | WEIGHT: 179.38 LBS | OXYGEN SATURATION: 97 % | SYSTOLIC BLOOD PRESSURE: 105 MMHG | HEIGHT: 69 IN | DIASTOLIC BLOOD PRESSURE: 71 MMHG | BODY MASS INDEX: 26.57 KG/M2

## 2025-01-31 DIAGNOSIS — S69.91XA INJURY OF FINGER OF RIGHT HAND, INITIAL ENCOUNTER: Primary | ICD-10-CM

## 2025-01-31 DIAGNOSIS — S69.91XA INJURY OF FINGER OF RIGHT HAND, INITIAL ENCOUNTER: ICD-10-CM

## 2025-01-31 DIAGNOSIS — B07.9 WART OF HAND: ICD-10-CM

## 2025-01-31 PROCEDURE — 73130 X-RAY EXAM OF HAND: CPT | Performed by: INTERNAL MEDICINE

## 2025-01-31 PROCEDURE — 99214 OFFICE O/P EST MOD 30 MIN: CPT | Performed by: INTERNAL MEDICINE

## 2025-01-31 RX ORDER — BUPROPION HYDROCHLORIDE 150 MG/1
TABLET ORAL
COMMUNITY
Start: 2024-11-07

## 2025-01-31 NOTE — PROGRESS NOTES
Peter Yanez is a 29 year old male with complaints of:  Chief Complaint: Bump (Bump on finger) and Finger Pain (Finger injury)    HPI     Peter Yanez is a(n) 29 year old male who presents for above.    Patient has had a bump on the palmar aspect of his index finger on his right hand for many years now.  Bump is flesh-colored.  Not itchy, not painful, not ulcerated.  May be getting slightly bigger.    Patient has also injured the middle finger on his right hand.  This occurred during a rock climbing incident at the end of December.  The pain has overall been stable.  He has been having pain when he flexes the finger.  He is not getting stuck in a flexed position.  He has pain also with lateral pressure and palpation.  He did put it in a splint for a while, which did not really help.    Past Medical History     No past medical history on file.     Past Surgical History     No past surgical history on file.     Family History     No family history on file.    Social History     Social History     Socioeconomic History    Marital status:    Tobacco Use    Smoking status: Never    Smokeless tobacco: Never   Vaping Use    Vaping status: Never Used   Substance and Sexual Activity    Alcohol use: Yes     Alcohol/week: 5.0 standard drinks of alcohol     Types: 5 Cans of beer per week     Comment: Weekly    Drug use: Yes     Types: Cannabis     Social Drivers of Health     Food Insecurity: No Food Insecurity (1/31/2025)    NCSS - Food Insecurity     Worried About Running Out of Food in the Last Year: No     Ran Out of Food in the Last Year: No   Transportation Needs: No Transportation Needs (1/31/2025)    NCSS - Transportation     Lack of Transportation: No   Housing Stability: Not At Risk (1/31/2025)    NCSS - Housing/Utilities     Has Housing: Yes     Worried About Losing Housing: No     Unable to Get Utilities: No       Allergies     Allergies[1]    Current Medications     Current Outpatient Medications    Medication Sig Dispense Refill    buPROPion  MG Oral Tablet 24 Hr        No current facility-administered medications for this visit.       Review of Systems     GENERAL HEALTH: feels well otherwise  SKIN: denies any unusual skin lesions or rashes  RESPIRATORY: denies shortness of breath with exertion  CARDIOVASCULAR: denies chest pain on exertion  GI: denies abdominal pain and denies heartburn  : denies any burning with urination, urinary frequency or urgency  NEURO: denies headaches, numbness or tingling, mental status changes  PSYCH: denies depressed mood, anxiety  MUSC: denies muscle aches, joint pain    Physical Exam     /71 (BP Location: Left arm, Patient Position: Sitting, Cuff Size: adult)   Pulse 71   Ht 5' 9\" (1.753 m)   Wt 179 lb 6.4 oz (81.4 kg)   SpO2 97%   BMI 26.49 kg/m²     GENERAL: well developed, well nourished,in no apparent distress  SKIN: no rashes,no suspicious lesions. < 1 cm round flesh-colored hard lesion on palmar aspect of right index finger.  HEENT: atraumatic, normocephalic  EXTREMITIES: no cyanosis, clubbing or edema    Assessment and Plan     Diagnoses and all orders for this visit:    Injury of finger of right hand, initial encounter.  Likely ligamentous or tendinous injury.  Low suspicion for fracture.  Rule out fracture with x-ray.  Follow-up with Ortho.  If no fracture, can consider occupational therapy.  -     XR HAND (2 VIEWS), RIGHT (CPT=73120); Future  -     Ortho Referral - In Network    Wart of hand.  Follow dermatology to have this removed.          buPROPion  MG Oral Tablet 24 Hr          Requested Prescriptions      No prescriptions requested or ordered in this encounter       No orders of the defined types were placed in this encounter.      No follow-ups on file.    The patient indicates understanding of these issues and agrees to the plan.    Electronically signed by Amina Shearer MD 01/31/25             [1]   Allergies  Allergen Reactions     Tree Nuts SWELLING

## 2025-03-03 ENCOUNTER — TELEPHONE (OUTPATIENT)
Dept: ORTHOPEDICS CLINIC | Facility: CLINIC | Age: 30
End: 2025-03-03

## 2025-03-03 DIAGNOSIS — M79.644 FINGER PAIN, RIGHT: Primary | ICD-10-CM

## 2025-03-03 NOTE — TELEPHONE ENCOUNTER
NP, referred by OT for R hand, middle finger pain.    Advised patient to arrive 20 min early for possible imaging.    Please advise, thanks.    Future Appointments   Date Time Provider Department Center   3/24/2025  8:30 AM Yariel Johnson MD [facility]

## 2025-03-24 ENCOUNTER — OFFICE VISIT (OUTPATIENT)
Dept: ORTHOPEDICS CLINIC | Facility: CLINIC | Age: 30
End: 2025-03-24
Payer: COMMERCIAL

## 2025-03-24 VITALS — BODY MASS INDEX: 26.51 KG/M2 | HEIGHT: 69 IN | WEIGHT: 179 LBS

## 2025-03-24 DIAGNOSIS — M79.644 PAIN OF RIGHT MIDDLE FINGER: Primary | ICD-10-CM

## 2025-03-24 PROCEDURE — 99203 OFFICE O/P NEW LOW 30 MIN: CPT | Performed by: STUDENT IN AN ORGANIZED HEALTH CARE EDUCATION/TRAINING PROGRAM

## 2025-03-24 RX ORDER — BUPROPION HYDROCHLORIDE 300 MG/1
TABLET ORAL
COMMUNITY
Start: 2024-12-01

## 2025-03-24 NOTE — PROGRESS NOTES
Clinic Note          The following individual(s) verbally consented to be recorded using ambient AI listening technology and understand that they can each withdraw their consent to this listening technology at any point by asking the clinician to turn off or pause the recording:    Patient name: Peter Yanez    Allergies[1]    CC: Right middle finger pain and swelling    DOI: 12/8/24    History of Present Illness  Peter Yanez is a 29 year old male who presents with right middle finger pain following an injury. He was referred by an occupational therapist for further evaluation.    In December, he sustained an injury to his right middle finger during a climbing activity resulting in pain when the finger is bent in a certain direction. Since the injury, he has experienced tightness when bending the finger and pain upon extension.    He describes persistent issues with his right middle finger, including pain and tightness, particularly during flexion and extension. No locking of the finger in an open or closed position, but there is occasional swelling, especially after a busy day. He also describes discomfort both on the front and back of the finger.    He has undergone some treatment, including active release technique, which provided some alleviation of symptoms. However, he has not seen significant improvement and describes the condition as 'really up and down.' An x-ray taken in January showed no bony injuries. He has not received formal treatment from an occupational therapist, as the therapist referred him for further evaluation instead.    Since December, the symptoms have improved slightly, but he still experiences pain and tightness. He has been using tape to support the finger, placing it lower on the finger, which he finds somewhat helpful.    He has not had similar injuries in the past, although he has experienced soreness from climbing activities. 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     Review of Systems:  Negative unless stated in HPI.    History/Other:   History reviewed. No pertinent past medical history.  History reviewed. No pertinent surgical history.  Current Outpatient Medications   Medication Sig Dispense Refill    buPROPion  MG Oral Tablet 24 Hr        History reviewed. No pertinent family history.  Social History     Occupational History    Not on file   Tobacco Use    Smoking status: Never    Smokeless tobacco: Never   Vaping Use    Vaping status: Never Used   Substance and Sexual Activity    Alcohol use: Yes     Alcohol/week: 5.0 standard drinks of alcohol     Types: 5 Cans of beer per week     Comment: Weekly    Drug use: Yes     Types: Cannabis    Sexual activity: Not on file        Physical Exam:     Ht 5' 9\" (1.753 m)   Wt 179 lb (81.2 kg)   BMI 26.43 kg/m²     Constitutional: NAD. AOx3. Well-developed and Well-nourished.   Psychiatric: Normal mood/ affect/ behavior. Judgment and thought content normal.     Right Upper Extremity:     Inspection    Skin intact. No skin lesions. No obvious mass visualized. No crepitus to ROM. Mild swelling throughout right middle finger.   Palpation    Mild tenderness to palpation at the A2 pulley of the right middle finger.       ROM    Able to make a full fist and extend all fingers, with mild middle finger pain when attempting to make a tight fist.    No bowstring is noted.    Improved pain when pressure is placed over the A2 pulley of the right middle finger  ROM is grossly normal including flexion / extension, and pronosupination of the forearm.       Neurovascular    Normal sensation in the median, ulnar, and radial nerve distribution. Normal motor function of muscles innervated by median/AIN, ulnar, and radial/PIN nerves.    Normally perfused hand(s).                 Diagnostic Studies:    Xrays of Right hand 3V: On my independent review of the radiographs, there is no acute bony abnormality  noted.    Assessment/Plan:  29 year old male with right middle finger pain, likely A2 pulley injury.    I reviewed the patient's electronic medical record, including the pertinent office notes, medical/surgical history, medications and images. I specifically reviewed the images noted above, independently and discussed my independent interpretation of these images in combination with the pertinent positive and negative findings in my clinical exam with the patient    Assessment & Plan  A2 Pulley Injury  Presents with a right middle finger injury since December, characterized by tightness during flexion, pain during extension, and occasional swelling. Likely involves the A2 pulley, common in rock climbers, due to stress on the pulleys when fingers are flexed. X-rays show no bony injuries, and the absence of bowstringing suggests the pulley is injured or irritated but not fully ruptured. The condition has shown some improvement over time but remains symptomatic. Anticipated outcome with occupational therapy is a gradual return to normal function by summer, with pain as a guide for activity progression. MRI is reserved for cases where symptoms worsen or do not improve with therapy.  - Refer to occupational therapy for a graded protocol to aid recovery.  - Advise continuation of taping the finger as needed for support.  - Consider use of a custom-made pulley ring if beneficial, to be determined and fashioned by the occupational therapy team  - Schedule follow-up in six weeks to assess progress and determine if MRI is needed if symptoms do not improve.      Yariel Johnson MD   Hand, Wrist, & Elbow Surgery  [email or web address]       [1]   Allergies  Allergen Reactions    Tree Nuts SWELLING

## 2025-03-25 ENCOUNTER — TELEPHONE (OUTPATIENT)
Dept: PHYSICAL THERAPY | Age: 30
End: 2025-03-25

## 2025-04-14 ENCOUNTER — TELEPHONE (OUTPATIENT)
Dept: PHYSICAL THERAPY | Facility: HOSPITAL | Age: 30
End: 2025-04-14

## 2025-04-15 ENCOUNTER — APPOINTMENT (OUTPATIENT)
Dept: PHYSICAL THERAPY | Age: 30
End: 2025-04-15
Attending: STUDENT IN AN ORGANIZED HEALTH CARE EDUCATION/TRAINING PROGRAM
Payer: COMMERCIAL

## 2025-04-18 ENCOUNTER — OFFICE VISIT (OUTPATIENT)
Dept: PHYSICAL THERAPY | Age: 30
End: 2025-04-18
Attending: STUDENT IN AN ORGANIZED HEALTH CARE EDUCATION/TRAINING PROGRAM
Payer: COMMERCIAL

## 2025-04-18 DIAGNOSIS — M79.644 PAIN OF RIGHT MIDDLE FINGER: Primary | ICD-10-CM

## 2025-04-18 PROCEDURE — 97165 OT EVAL LOW COMPLEX 30 MIN: CPT

## 2025-04-18 PROCEDURE — 97110 THERAPEUTIC EXERCISES: CPT

## 2025-04-18 NOTE — PROGRESS NOTES
OT UE EVALUATION:     Diagnosis:   Pain of right middle finger (M79.644) Patient:  Peter Yanez (29 year old, male)        Referring Provider: Yariel Johnson  Today's Date   4/18/2025    Precautions:  None   Date of Evaluation: 04/18/25  Next MD visit: No data recorded  Date of Injury: December 2024  Date of Surgery: No data recorded     PATIENT SUMMARY   Summary of chief complaints: Tightness and stiffness  History of current condition: In December, he sustained an injury to his right middle finger during a climbing activity resulting in pain when the finger is bent in a certain direction. Since the injury, he has experienced tightness when bending the finger and pain upon extension.He describes persistent issues with his right middle finger, including pain and tightness, particularly during flexion and extension. No locking of the finger in an open or closed position, but there is occasional swelling, especially after a busy day. He also describes discomfort both on the front and back of the finger.     He has undergone some treatment, including active release technique, which provided some alleviation of symptoms. However, he has not seen significant improvement and describes the condition as 'really up and down.   Pain level: current 0 /10, at best 0 /10, at worst 7 /10  Description of symptoms: Sharp, achy   Occupation:  at [employer]   Occupational Roles: worker   Leisure activities/Hobbies: Rock climbing   Prior level of function: Independent  Current limitations: Carrying/lifting, opening jar  Pt goals: Restore pain-free finger ROM and full tendon glide  Improve  strength and endurance  Return to climbing activity with safe loading progression  Prevent reinjury through education and joint protection strategies  Hand Dominance: left  Living Situation:  Family    Past medical history was reviewed with Peter.  Significant findings include: none  Imaging/Tests: X-ray   Peter  has no past  medical history on file.  He  has no past surgical history on file.    ASSESSMENT  Peter presents to occupational therapy evaluation with primary c/o Tightness and stiffness. The results of the objective tests and measures show Decreased ROM, decreased strength, increased edema and increased subjective c/o pain. Functional deficits include but are not limited to Carrying/lifting, opening jar. Signs and symptoms are consistent with diagnosis of Pain of right middle finger (M79.644). Pt and OT discussed evaluation findings, pathology, POC and HEP.  Pt voiced understanding and performs HEP correctly without reported pain. Skilled Occupational Therapy is medically necessary to address the above impairments and reach functional goals.  OBJECTIVE:    Musculoskeletal  Observation: Unremarkable Unremarkable       Orthotics: none      Cervical Screen: N/A  Special Tests: N/A     ROM and Strength  (* denotes performed with pain)  R Hand ROM   IF MF RF SF     MP 85 85 75 85     PIP 90 90 90 95     DIP 60 65 55 75     JOHNSON 235 240 220 255      Hand Strength (lbs) R L      85 110     2 pt Pinch 13 16     3 pt Pinch 16 20     Lateral pinch 26 25       Flexibility:         Edema:  R Hand  Edema (cm) MF     PIP 6.9 cm        Neurological:  Sensory: WNL       Light Touch Ten Test: WNL    ADLs/IADLs:  ADL's    Bathing: Independent     Dressing: Independent     Feeding: Independent     Grooming: Independent  IADL's     Homemaking: Independent     Food Prep: Independent     Driving: Independent   Other Functional Mobility/ADL Comments: Independent      Today's Treatment and Response:   Pt education was provided on exam findings, treatment diagnosis, treatment plan, expectations, and prognosis.  Today's Treatment       4/18/2025   OT Treatment   Therapeutic Exercise AROM with fluidottherapy x 5\"   Therapeutic Exercise Min 10   Eval Min 35   Total Timed Procedures 10   Total Service Procedures 45   Total Time 45         Patient was  instructed in and issued a HEP for: [employer]     Charges:  OT EVAL  , 1 OT eval, 1 TE   Based on analysis of data from a problem-focused assessment from a brief chart review, clinical presentation of physical, cognitive and psychosocial skills, as well as review of patient rated outcome measures, this evaluation involved Low complexity decision making, with 1-3 occupational performance component deficits, no comorbidities, and no need for modification of tasks or assistance with assessment.                                                                                    PLAN OF CARE:    Goals: (to be met in 8 visits)   Increase JOHNSON of (R) RF by 20-30 degrees to improve functional grasp during work related tasks.  Increase 2pt pinch by 3-4 lbs to facilitate improved manipulation of small climbing holds and warehouse items.  Increase  strength by 4-5 lbs to support safe lifting and carrying of items during work shifts.  Increase 3 pt pinch by 2-3 lbs to enable improved handling of climbing gear and work tools.   Patient will resume climbing and full work duties without limitations or pain, using appropriate joint protection and adaptive strategies as needed.       Frequency / Duration: Patient will be seen 1-2 x week for 4 visitsx/week or a total of 8 visits over a 90 day period. Treatment will include: Manual Therapy; Neuromuscular Re-education; Self-Care Home Management; Therapeutic Exercise; Therapeutic Activities; Other (use comment) (Parrafin)    Education or treatment limitation: None   Rehab Potential: good     QuickDASH Outcome Score  Score: (Patient-Rptd) 18.18 % (4/17/2025  4:22 PM)      Patient/Family/Caregiver was advised of these findings, precautions, and treatment options and has agreed to actively participate in planning and for this course of care.    Thank you for your referral. Please co-sign or sign and return this letter via fax as soon as possible to [phone]. If you have any  questions, please contact me at Dept: [phone]    Sincerely,  Electronically signed by therapist: Anderson Garcia, OT  Physician's certification required: Yes  I certify the need for these services furnished under this plan of treatment and while under my care.    X___________________________________________________ Date____________________    Certification From: 4/18/2025  To:7/17/2025

## 2025-04-22 ENCOUNTER — OFFICE VISIT (OUTPATIENT)
Dept: PHYSICAL THERAPY | Age: 30
End: 2025-04-22
Attending: STUDENT IN AN ORGANIZED HEALTH CARE EDUCATION/TRAINING PROGRAM
Payer: COMMERCIAL

## 2025-04-22 PROCEDURE — 97150 GROUP THERAPEUTIC PROCEDURES: CPT

## 2025-04-22 PROCEDURE — 97140 MANUAL THERAPY 1/> REGIONS: CPT

## 2025-04-22 NOTE — PROGRESS NOTES
Patient: Peter Yanez (29 year old, male) Referring Provider:  Insurance:   Diagnosis: Pain of right middle finger (M79.644) Yariel [employer]   Date of Surgery: No data recorded Next MD visit:  N/A   Precautions:  None No data recorded Referral Information:   Date of Injury: December 2024 Date of Evaluation: Req: 0, Auth: 0, Exp:     04/18/25 POC Auth Visits:          Today's Date   4/22/2025    Subjective  Pt states that the exercises have been helping.       Pain: 4/10     Objective  See tx log for details             Assessment  Reviewe HEP with pt. Pt performing as instruced. Noted tightness in middle finger with palpation most likely from claw  during rock climbing. Recommeded performing stretches for fingers, hand and wrist to lengthen tissue and to minimize stress to flexors for optimal function. Pt agreed.    Goals (to be met in 8 visits)   Increase JOHNSON of (R) RF by 20-30 degrees to improve functional grasp during work related tasks.  Increase 2pt pinch by 3-4 lbs to facilitate improved manipulation of small climbing holds and warehouse items.  Increase  strength by 4-5 lbs to support safe lifting and carrying of items during work shifts.  Increase 3 pt pinch by 2-3 lbs to enable improved handling of climbing gear and work tools.   Patient will resume climbing and full work duties without limitations or pain, using appropriate joint protection and adaptive strategies as needed.           Plan  Patient will be seen 1-2 x week for 4 visitsx/week or a total of 8 visits over a 90 day period.    Treatment Last 4 Visits        4/18/2025 4/22/2025   OT Treatment   Treatment Day  2   Therapeutic Exercise AROM with fluidottherapy x 5\" AROM with fluidotherapy x 5\"   Promaster plus x 3 min  Flex bar rolls (green) x 3 min  Power web stretches x 3 min  Sponges abd/add    Manual Therapy  STM  DTM   Therapeutic Exercise Min 10 35   Manual Therapy Min  10   Eval Min 35    Total Timed Procedures  10 45   Total Service Procedures 45 45   Total Time 45 45              Charges     1 MT, 2 TE    Anderson Garcia,OTR/L

## 2025-04-25 ENCOUNTER — OFFICE VISIT (OUTPATIENT)
Dept: PHYSICAL THERAPY | Age: 30
End: 2025-04-25
Attending: STUDENT IN AN ORGANIZED HEALTH CARE EDUCATION/TRAINING PROGRAM
Payer: COMMERCIAL

## 2025-04-25 PROCEDURE — 97140 MANUAL THERAPY 1/> REGIONS: CPT

## 2025-04-25 PROCEDURE — 97110 THERAPEUTIC EXERCISES: CPT

## 2025-04-25 NOTE — PROGRESS NOTES
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          Pain: 3/10     Objective  See tx log for details         Assessment  Mild decrease in tightness noted at middle finger. Pt continues to have weakness in hand contributing to pain with flexion. Advised pt to refrain from increased gripping at this time. Pt agreed.    Goals (to be met in 8 visits)   Increase JOHNSON of (R) RF by 20-30 degrees to improve functional grasp during work related tasks.  Increase 2pt pinch by 3-4 lbs to facilitate improved manipulation of small climbing holds and warehouse items.  Increase  strength by 4-5 lbs to support safe lifting and carrying of items during work shifts.  Increase 3 pt pinch by 2-3 lbs to enable improved handling of climbing gear and work tools.   Patient will resume climbing and full work duties without limitations or pain, using appropriate joint protection and adaptive strategies as needed.               Plan  Patient will be seen 1-2 x week for 4 visitsx/week or a total of 8 visits over a 90 day period.    Treatment Last 4 Visits        4/18/2025 4/22/2025 4/25/2025   OT Treatment   Treatment Day  2 3   Therapeutic Exercise AROM with fluidottherapy x 5\" AROM with fluidotherapy x 5\"   Promaster plus x 3 min  Flex bar rolls (green) x 3 min  Power web stretches x 3 min  Sponges abd/add  AROM with fluidotherapy x 5\"   Promaster plus x 3 min  Flex bar rolls (green) x 3 min  Power web stretches x 3 min  Sponges abd/add      Manual Therapy  STM  DTM STM  DTM    Therapeutic Exercise Min 10 35 36   Manual Therapy Min  10 8   Eval Min 35     Total Timed Procedures 10 45 44   Total Service Procedures 45 45 44   Total Time 45 45 44              Charges     1MT, 2 TE    Anderson Garcia,OTR/L

## 2025-04-29 ENCOUNTER — OFFICE VISIT (OUTPATIENT)
Dept: PHYSICAL THERAPY | Age: 30
End: 2025-04-29
Attending: STUDENT IN AN ORGANIZED HEALTH CARE EDUCATION/TRAINING PROGRAM
Payer: COMMERCIAL

## 2025-04-29 PROCEDURE — 97140 MANUAL THERAPY 1/> REGIONS: CPT

## 2025-04-29 PROCEDURE — 97110 THERAPEUTIC EXERCISES: CPT

## 2025-04-29 NOTE — PROGRESS NOTES
Patient: Peter Yanez (29 year old, male) Referring Provider:  Insurance:   Diagnosis: Pain of right middle finger (M79.644) Yariel [employer]   Date of Surgery: No data recorded Next MD visit:  N/A   Precautions:  None No data recorded Referral Information:   Date of Injury: December 2024 Date of Evaluation: Req: 0, Auth: 0, Exp:     04/18/25 POC Auth Visits:          Today's Date   4/29/2025    Subjective  Pt states he has mild pain when hand is flexed, but no pain when at rest.       Pain: 1/10     Objective  See tx log for details       Assessment  Pt reported his middle finger was swollen and sore yesterday. Symptoms are most likely due to slight strain in the collateral ligaments when completing home making tasks. Advised pt to monitor hand positioning when completing functional activities to prevent digit hyperextension and clawing. Recommeded pt apply ice to reduce swelling and alleviate soreness. Discussed with pt importance of upper body strengthening.    Goals (to be met in 8 visits)   Increase JOHNSON of (R) RF by 20-30 degrees to improve functional grasp during work related tasks.  Increase 2pt pinch by 3-4 lbs to facilitate improved manipulation of small climbing holds and warehouse items.  Increase  strength by 4-5 lbs to support safe lifting and carrying of items during work shifts.  Increase 3 pt pinch by 2-3 lbs to enable improved handling of climbing gear and work tools.   Patient will resume climbing and full work duties without limitations or pain, using appropriate joint protection and adaptive strategies as needed.     Plan  Patient will be seen 1-2 x week for 4 visitsx/week or a total of 8 visits over a 90 day period.    Treatment Last 4 Visits        4/18/2025 4/22/2025 4/25/2025 4/29/2025   OT Treatment   Treatment Day  2 3 4   Therapeutic Exercise AROM with fluidottherapy x 5\" AROM with fluidotherapy x 5\"   Promaster plus x 3 min  Flex bar rolls (green) x 3 min  Power  web stretches x 3 min  Sponges abd/add  AROM with fluidotherapy x 5\"   Promaster plus x 3 min  Flex bar rolls (green) x 3 min  Power web stretches x 3 min  Sponges abd/add    AROM with fluidotherapy x 5\"   Power web stretches x 3 min  Sponges abd/add   Velcro pulls**   Manual Therapy  STM  DTM STM  DTM STM   Therapeutic Exercise Min 10 35 36 35   Manual Therapy Min  10 8 10   Eval Min 35      Total Timed Procedures 10 45 44 45   Total Service Procedures 45 45 44 45   Total Time 45 45 44 45         Charges     1MT,2TE    Laurie Phipps, SOT  Anderson Garcia, OTR/L

## 2025-05-02 ENCOUNTER — APPOINTMENT (OUTPATIENT)
Dept: PHYSICAL THERAPY | Age: 30
End: 2025-05-02
Attending: STUDENT IN AN ORGANIZED HEALTH CARE EDUCATION/TRAINING PROGRAM
Payer: COMMERCIAL

## 2025-05-06 ENCOUNTER — OFFICE VISIT (OUTPATIENT)
Dept: PHYSICAL THERAPY | Age: 30
End: 2025-05-06
Attending: STUDENT IN AN ORGANIZED HEALTH CARE EDUCATION/TRAINING PROGRAM
Payer: COMMERCIAL

## 2025-05-06 PROCEDURE — 97110 THERAPEUTIC EXERCISES: CPT

## 2025-05-06 PROCEDURE — 97140 MANUAL THERAPY 1/> REGIONS: CPT

## 2025-05-06 NOTE — PROGRESS NOTES
Patient: Peter Yanez (29 year old, male) Referring Provider:  Insurance:   Diagnosis: Pain of right middle finger (M79.644) [employer]   Date of Surgery: No data recorded Next MD visit:  N/A   Precautions:  None No data recorded Referral Information:   Date of Injury: December 2024 Date of Evaluation: Req: 0, Auth: 0, Exp:     04/18/25 POC Auth Visits:          Today's Date   5/6/2025    Subjective  Pt states that the main source of pain is in the middle joint of his MF.       Pain: 2/10     Objective  See tx log for details           Assessment  Mild tightness reported in middle finger, \"at joint\".  Upon palpation, pt presented with intrisic tightness from collateral ligaaments and contiued edema. Improved ROM with middle finger after manual therapy. Pt required several verbal cues while strengthening extensors in clinic as pt tended to use hook fist. Advised pt to monitor body mechanics while using hand for actvties to mininze stress to flexors. Pt agreed.  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 Recommended pt wear padded glove to mininze stress to hand. Pt agreed. Discussed importance of strengthening UE and core for return to rock climbing. Issued HEP for return to rock climbing and instructed pt to focus on phase I at this time to increase scapular/shoulder and core strength. Pt agreed. Will review at next session and advance to Phase II if appropriate.    Goals (to be met in 8 visits)   Increase JOHNSON of (R) RF by 20-30 degrees to improve functional grasp during work related tasks.  Increase 2pt pinch by 3-4 lbs to facilitate improved manipulation of small climbing holds and warehouse items.  Increase  strength by 4-5 lbs to support safe lifting and carrying of items during work shifts.  Increase 3 pt pinch by 2-3 lbs to enable improved handling of climbing gear and work tools.   Patient will resume climbing  and full work duties without limitations or pain, using appropriate joint protection and adaptive strategies as needed.                   Plan  Patient will be seen 1-2 x week for 4 visitsx/week or a total of 8 visits over a 90 day period.    Treatment Last 4 Visits        4/22/2025 4/25/2025 4/29/2025 5/6/2025   OT Treatment   Treatment Day 2 3 4 5   Therapeutic Exercise AROM with fluidotherapy x 5\"   Promaster plus x 3 min  Flex bar rolls (green) x 3 min  Power web stretches x 3 min  Sponges abd/add  AROM with fluidotherapy x 5\"   Promaster plus x 3 min  Flex bar rolls (green) x 3 min  Power web stretches x 3 min  Sponges abd/add    AROM with fluidotherapy x 5\"   Power web stretches x 3 min  Sponges abd/add   Velcro pulls** AROM with fluidotherapy x 5\"   Power web stretches x 3 min (green)  Graded clothespins (blk- 3 pt pinch, blue- 2pt pinch, Green - lat pinch)  x 2  Bulk putty (flatten)     Manual Therapy STM  DTM STM  DTM STM STM  IASTM   Therapeutic Exercise Min 35 36 35 35   Manual Therapy Min 10 8 10 10   Total Timed Procedures 45 44 45 45   Total Service Procedures 45 44 45 45   Total Time 45 44 45 45         HEP  Return to rock climbing protocol    Charges     1MT, 2 NANCY GarciaOTR/L

## 2025-05-13 ENCOUNTER — OFFICE VISIT (OUTPATIENT)
Dept: PHYSICAL THERAPY | Age: 30
End: 2025-05-13
Attending: STUDENT IN AN ORGANIZED HEALTH CARE EDUCATION/TRAINING PROGRAM
Payer: COMMERCIAL

## 2025-05-13 PROCEDURE — 97110 THERAPEUTIC EXERCISES: CPT

## 2025-05-13 PROCEDURE — 97140 MANUAL THERAPY 1/> REGIONS: CPT

## 2025-05-13 NOTE — PROGRESS NOTES
Patient: Peter Yanez (29 year old, male) Referring Provider:  Insurance:   Diagnosis: Pain of right middle finger (M79.644) [employer]   Date of Surgery: No data recorded Next MD visit:  N/A   Precautions:  None No data recorded Referral Information:   Date of Injury: December 2024 Date of Evaluation: Req: 0, Auth: 0, Exp:     04/18/25 POC Auth Visits:          Today's Date   5/13/2025    Subjective  Pt states his finger feels less stiff       Pain: 1/10     Objective  See tx log for details      Assessment  Reviewed HEP with pt. Pt has been performing as instructed. Pt had some mild swelling in the MF. Performed gentle massage.Reduced tightness at P3 MF. Pt able to complete excercises during session with no c/o pain.However, pt had some mild fatigue when completing UE excercises during session. Symptoms most likely due to muscular weakness. Reviewed the importance of UE strengthening. Pt verbalized good understanding.    Goals (to be met in 8 visits)   Increase JOHNSON of (R) RF by 20-30 degrees to improve functional grasp during work related tasks.  Increase 2pt pinch by 3-4 lbs to facilitate improved manipulation of small climbing holds and warehouse items.  Increase  strength by 4-5 lbs to support safe lifting and carrying of items during work shifts.  Increase 3 pt pinch by 2-3 lbs to enable improved handling of climbing gear and work tools.   Patient will resume climbing and full work duties without limitations or pain, using appropriate joint protection and adaptive strategies as needed.         Plan  Patient will be seen 1-2 x week for 4 visitsx/week or a total of 8 visits over a 90 day period.    Treatment Last 4 Visits        4/25/2025 4/29/2025 5/6/2025 5/13/2025   OT Treatment   Treatment Day 3 4 5 6    6   Therapeutic Exercise AROM with fluidotherapy x 5\"   Promaster plus x 3 min  Flex bar rolls (green) x 3 min  Power web stretches x 3 min  Sponges abd/add    AROM with  fluidotherapy x 5\"   Power web stretches x 3 min  Sponges abd/add   Velcro pulls** AROM with fluidotherapy x 5\"   Power web stretches x 3 min (green)  Graded clothespins (blk- 3 pt pinch, blue- 2pt pinch, Green - lat pinch)  x 2  Bulk putty (flatten)   SB ball push ups   PRE's (flex/scap) w/ 8Ib weights   Elbow planks alt elbow extended     Manual Therapy STM  DTM STM STM  IASTM STM   Therapeutic Exercise Min 36 35 35 35   Manual Therapy Min 8 10 10 10   Total Timed Procedures 44 45 45 45   Total Service Procedures 44 45 45 45   Total Time 44 45 45 45       Multiple values from one day are sorted in reverse-chronological order         Charges     1MT, 2TE    Laurie Phipps, SOT  Anderson Garcia, OTR/L

## 2025-05-16 ENCOUNTER — APPOINTMENT (OUTPATIENT)
Dept: PHYSICAL THERAPY | Age: 30
End: 2025-05-16
Payer: COMMERCIAL

## 2025-05-19 ENCOUNTER — APPOINTMENT (OUTPATIENT)
Dept: PHYSICAL THERAPY | Age: 30
End: 2025-05-19
Attending: FAMILY MEDICINE
Payer: COMMERCIAL

## 2025-05-23 ENCOUNTER — APPOINTMENT (OUTPATIENT)
Dept: PHYSICAL THERAPY | Age: 30
End: 2025-05-23
Payer: COMMERCIAL

## (undated) NOTE — Clinical Note
6/9/2017              [address]        [address]         Dear Tangela Solis,      It was a pleasure to see you at our [place] , [address], [address] [facility] office.   Your knee xrays were normal and Dr. Jordy Herndon would [facility]

## (undated) NOTE — MR AVS SNAPSHOT
[facility]  [address], [address]  [phone]               Thank you for choosing us for your health care visit with Hawk Hernandez MD.  We are glad to serve you and happy to provide you with this summary o acquired. Please contact the Patient Business Office at [phone] if you have any questions related to insurance coverage. Wednesday June 07, 2017     Imaging:  XR KNEE, COMPLETE (4 OR MORE VIEWS), RIGHT ([id number])    Instructions:   To schedule

## (undated) NOTE — LETTER
David Milner, [address]  [address], [place]       09/27/19        Patient: Nehemias Meadows Head   YOB: 1995   Date of Visit: 9/27/2019       Dear  Dr. Tamar Beltran MD,      Thank you for referring Manual Wilson to my practice.   P